# Patient Record
Sex: FEMALE | Race: WHITE | NOT HISPANIC OR LATINO | Employment: FULL TIME | URBAN - METROPOLITAN AREA
[De-identification: names, ages, dates, MRNs, and addresses within clinical notes are randomized per-mention and may not be internally consistent; named-entity substitution may affect disease eponyms.]

---

## 2023-09-28 ENCOUNTER — TELEPHONE (OUTPATIENT)
Dept: GASTROENTEROLOGY | Facility: CLINIC | Age: 63
End: 2023-09-28

## 2023-09-28 ENCOUNTER — OFFICE VISIT (OUTPATIENT)
Dept: GASTROENTEROLOGY | Facility: CLINIC | Age: 63
End: 2023-09-28
Payer: COMMERCIAL

## 2023-09-28 VITALS
DIASTOLIC BLOOD PRESSURE: 84 MMHG | SYSTOLIC BLOOD PRESSURE: 122 MMHG | WEIGHT: 168.2 LBS | HEIGHT: 60 IN | BODY MASS INDEX: 33.02 KG/M2

## 2023-09-28 DIAGNOSIS — Z12.11 COLON CANCER SCREENING: ICD-10-CM

## 2023-09-28 DIAGNOSIS — K21.9 GASTROESOPHAGEAL REFLUX DISEASE WITHOUT ESOPHAGITIS: Primary | ICD-10-CM

## 2023-09-28 PROCEDURE — 99204 OFFICE O/P NEW MOD 45 MIN: CPT | Performed by: INTERNAL MEDICINE

## 2023-09-28 RX ORDER — CLINDAMYCIN HYDROCHLORIDE 300 MG/1
CAPSULE ORAL
COMMUNITY
Start: 2023-09-21

## 2023-09-28 RX ORDER — LISINOPRIL 20 MG/1
20 TABLET ORAL DAILY
COMMUNITY
Start: 2023-09-24

## 2023-09-28 RX ORDER — PANTOPRAZOLE SODIUM 40 MG/1
40 TABLET, DELAYED RELEASE ORAL DAILY
COMMUNITY
Start: 2023-09-18 | End: 2023-09-28

## 2023-09-28 RX ORDER — LAMOTRIGINE 100 MG/1
TABLET ORAL
COMMUNITY
Start: 2023-09-22

## 2023-09-28 RX ORDER — ESOMEPRAZOLE MAGNESIUM 40 MG/1
40 CAPSULE, DELAYED RELEASE ORAL
Qty: 60 CAPSULE | Refills: 5 | Status: SHIPPED | OUTPATIENT
Start: 2023-09-28

## 2023-09-28 NOTE — TELEPHONE ENCOUNTER
Scheduled date of EGD(as of today): 11/29/23  Physician performing EGD: Dr Nicolas Pena  Location of EGD: [facility]  Instructions reviewed with patient by: Zach Reese  Clearances: No

## 2023-09-28 NOTE — PROGRESS NOTES
[address] [place] [address] [facility] - Outpatient Consultation  Akshat Rivera 63 y.o. female MRN: [id number]  Encounter: [id number]          ASSESSMENT AND PLAN:      1. Gastroesophageal reflux disease without esophagitis  Known GERD. Now with typical and atypical symptoms. Unclear how much is GERD versus non-GERD related  - EGD; Future  -Switch to esomeprazole (NexIUM) 40 MG capsule; Take 1 capsule (40 mg total) by mouth 2 (two) times a day before meals  Dispense: 60 capsule; Refill: 5    2. Colon cancer screening  Last colonoscopy October 2015. Due for follow-up 2025    ______________________________________________________________________    HPI: The patient is seen in the office today for evaluation of GERD. She was worked up by me in the distant past including an endoscopy in April 2010. She had been on omeprazole in the past which she reports gave her some tingling in her lips. She was on Pepcid. Over the last year she is having increasing issues with both typical and atypical GERD symptoms. She reports some heartburn and indigestion but also reports some postnasal drip with a chronic cough and some regurgitation. She reports some intermittent dysphagia. She denies any odynophagia or early satiety. She denies any chest or abdominal pain she was started on pantoprazole 40 mg daily about 5 months ago which has helped a little but did not completely alleviate her symptoms. She reports that she will often wake up in the morning with a burning tongue and a sore throat. She reports wine makes her symptoms worse. She does not have issues with caffeine. She does not use NSAIDs. Her weight is stable. Her bowels are normal.  She denies any GI bleeding. REVIEW OF SYSTEMS:    CONSTITUTIONAL: Denies any fever, chills, rigors, and weight loss. HEENT: No earache or tinnitus. Denies hearing loss or visual disturbances. CARDIOVASCULAR: No chest pain or palpitations.    RESPIRATORY: Denies any cough, hemoptysis, shortness of breath or dyspnea on exertion. GASTROINTESTINAL: As noted in the History of Present Illness. GENITOURINARY: No problems with urination. Denies any hematuria or dysuria. NEUROLOGIC: No dizziness or vertigo, denies headaches. MUSCULOSKELETAL: Denies any muscle or joint pain. SKIN: Denies skin rashes or itching. ENDOCRINE: Denies excessive thirst. Denies intolerance to heat or cold. PSYCHOSOCIAL: Denies depression or anxiety. Denies any recent memory loss. Historical Information   Past Medical History:   Diagnosis Date   • Colon polyp ? ?   • GERD (gastroesophageal reflux disease)    • Hypertension      Past Surgical History:   Procedure Laterality Date   • COLONOSCOPY  within 10 years    2015   • UPPER GASTROINTESTINAL ENDOSCOPY  a few times years ago    2010     Social History   Social History     Substance and Sexual Activity   Alcohol Use Yes   • Alcohol/week: 3.0 standard drinks of alcohol   • Types: 3 Glasses of wine per week     Social History     Substance and Sexual Activity   Drug Use Not Currently   • Types: Marijuana     Social History     Tobacco Use   Smoking Status Former   • Packs/day: 0.25   • Years: 5.00   • Total pack years: 1.25   • Types: Cigarettes   Smokeless Tobacco Never   Tobacco Comments    occasional around 23years old     Family History   Problem Relation Age of Onset   • Cancer Mother         lung-non smoker   • Heart disease Father         heart attack at 46   • Colon cancer Neg Hx    • Colon polyps Neg Hx        Meds/Allergies       Current Outpatient Medications:   •  clindamycin (CLEOCIN) 300 MG capsule  •  esomeprazole (NexIUM) 40 MG capsule  •  lamoTRIgine (LaMICtal) 100 mg tablet  •  lisinopril (ZESTRIL) 20 mg tablet    Allergies   Allergen Reactions   • Penicillins Anaphylaxis   • Nsaids Facial Swelling   • Cefuroxime Rash           Objective     Blood pressure 122/84, height 5' (1.524 m), weight 76.3 kg (168 lb 3.2 oz).  Body mass index is 32.85 kg/m². PHYSICAL EXAM:      General Appearance:   Alert, cooperative, no distress   HEENT:   Normocephalic, atraumatic, anicteric.     Neck:  Supple, symmetrical, trachea midline   Lungs:   Clear to auscultation bilaterally; no rales, rhonchi or wheezing; respirations unlabored    Heart[de-identified]   Regular rate and rhythm; no murmur, rub, or gallop. Abdomen:   Soft, non-tender, non-distended; normal bowel sounds; no masses, no organomegaly    Genitalia:   Deferred    Rectal:   Deferred    Extremities:  No cyanosis, clubbing or edema    Pulses:  2+ and symmetric    Skin:  No jaundice, rashes, or lesions    Lymph nodes:  No palpable cervical lymphadenopathy        Lab Results:   No visits with results within 1 Day(s) from this visit. Latest known visit with results is:   No results found for any previous visit. Radiology Results:   No results found.

## 2023-10-02 ENCOUNTER — TELEPHONE (OUTPATIENT)
Age: 63
End: 2023-10-02

## 2023-11-10 ENCOUNTER — APPOINTMENT (OUTPATIENT)
Dept: RADIOLOGY | Facility: CLINIC | Age: 63
End: 2023-11-10
Payer: COMMERCIAL

## 2023-11-10 ENCOUNTER — OFFICE VISIT (OUTPATIENT)
Dept: OBGYN CLINIC | Facility: CLINIC | Age: 63
End: 2023-11-10
Payer: COMMERCIAL

## 2023-11-10 VITALS
DIASTOLIC BLOOD PRESSURE: 84 MMHG | WEIGHT: 170 LBS | BODY MASS INDEX: 33.38 KG/M2 | SYSTOLIC BLOOD PRESSURE: 124 MMHG | HEIGHT: 60 IN | HEART RATE: 63 BPM

## 2023-11-10 DIAGNOSIS — G89.29 CHRONIC PAIN OF LEFT KNEE: ICD-10-CM

## 2023-11-10 DIAGNOSIS — M25.562 CHRONIC PAIN OF LEFT KNEE: ICD-10-CM

## 2023-11-10 DIAGNOSIS — Z96.652 HISTORY OF LEFT KNEE REPLACEMENT: ICD-10-CM

## 2023-11-10 DIAGNOSIS — Z96.652 HISTORY OF LEFT KNEE REPLACEMENT: Primary | ICD-10-CM

## 2023-11-10 PROCEDURE — 73562 X-RAY EXAM OF KNEE 3: CPT

## 2023-11-10 PROCEDURE — 77073 BONE LENGTH STUDIES: CPT

## 2023-11-10 PROCEDURE — 99203 OFFICE O/P NEW LOW 30 MIN: CPT | Performed by: STUDENT IN AN ORGANIZED HEALTH CARE EDUCATION/TRAINING PROGRAM

## 2023-11-10 NOTE — PROGRESS NOTES
Knee New Office Note    Assessment:     1. History of left knee replacement    2. Chronic pain of left knee        Plan:     Problem List Items Addressed This Visit    None  Visit Diagnoses       History of left knee replacement    -  Primary    Relevant Orders    XR knee 3 vw left non injury    XR bone length (scanogram)    C-reactive protein    Sedimentation rate, automated    Chronic pain of left knee               67 y/o female with left knee pain following TKA with Dr. Venus Zarate at [facility] in 2022. Xrays of the left knee reviewed today showing that the prosthesis appears to be in good position with no obvious signs of loosening. She has good range of motion on exam, with some mild mid-flexion laxity. She does not have symptoms that are classic of start up pain to suggest loosening. We will start by working her up for infection with CRP and sed rate. If these are elevated, we will consider aspiration of the left knee for eval for infection. Also may consider bone scan in the future. Follow up in a few weeks to discuss lab results. Subjective:     Patient ID: Gurjit Huog is a 66 y.o. female. Patient seen in consultation at the request of Dr. Elizabeth Sandhoff, MD    Chief Complaint:  HPI:  Patient is a 67 y/o female who presents today for a second opinion of her Left knee. She has a history of a Left press-fit triathalon TKA with Dr. Venus Zarate at [facility] in May of 2022. She states that since that time she has never felt that the knee has been right. She notices stiffness in the left knee, worse with walking and after she has been walking for long periods of time. She states that until recently she was struggling with being able to pick her foot up to ambulate. She also complains of swelling over the anterior aspect of the knee that has been present for 9 months. She denies classic start up symptoms. She denies fever or chills.  She denies any workup for infection in the past.     Allergy:  Allergies   Allergen Reactions    Penicillins Anaphylaxis    Nsaids Facial Swelling    Cefuroxime Rash     Medications:  all current active meds have been reviewed  Past Medical History:  Past Medical History:   Diagnosis Date    Colon polyp ? ? GERD (gastroesophageal reflux disease)     Hypertension      Past Surgical History:  Past Surgical History:   Procedure Laterality Date    COLONOSCOPY  within 10 years    2015    UPPER GASTROINTESTINAL ENDOSCOPY  a few times years ago    2010     Family History:  Family History   Problem Relation Age of Onset    Cancer Mother         lung-non smoker    Heart disease Father         heart attack at 46    Colon cancer Neg Hx     Colon polyps Neg Hx      Social History:  Social History     Substance and Sexual Activity   Alcohol Use Yes    Alcohol/week: 3.0 standard drinks of alcohol    Types: 3 Glasses of wine per week     Social History     Substance and Sexual Activity   Drug Use Not Currently    Types: Marijuana     Social History     Tobacco Use   Smoking Status Former    Packs/day: 0.25    Years: 5.00    Total pack years: 1.25    Types: Cigarettes   Smokeless Tobacco Never   Tobacco Comments    occasional around 23years old           ROS:  General: Per HPI  Skin: Negative, except if noted below  HEENT: Negative  Respiratory: Negative  Cardiovascular: Negative  Gastrointestinal: Negative  Urinary: Negative  Vascular: Negative  Musculoskeletal: Positive per HPI   Neurologic: Positive per HPI  Endocrine: Negative    Objective:  BP Readings from Last 1 Encounters:   11/10/23 124/84      Wt Readings from Last 1 Encounters:   11/10/23 77.1 kg (170 lb)        Respiratory:   non-labored respirations    Lymphatics:  no palpable lymph nodes    Gait:   antalgic    Neurologic:   Alert and oriented times 3  Patient with normal sensation except as noted below  Deep tendon reflexes 2+ except as noted in MSK exam    Bilateral Lower Extremity:  Left Knee:       Inspection:  skin intact, previous surgical incision noted, small aseptic pre-patellar fluid collection     Overall limb alignment neutral    Effusion: none    ROM 0-120 wo pain    Extensor Lag: none    Palpation: no Joint line tenderness to palpation    AP Stability at 90 deg stable    M/L stability in full extension stable    M/L stability in midflexion +mild instability    Motor: 5/5 Q/HS/TA/GS/P    Pulses: 2+ DP / 2+ PT    SILT DP/SP/S/S/TN      Imaging:  My interpretation XR AP scanogram/AP bilateral knee/lateral/nguyen/sunrise left knee: s/p left press-fit CR triathalon TKA, components in good position. No fracture or dislocation. Severe right knee tricompartmental osteoarthritis. BMI:   Estimated body mass index is 33.2 kg/m² as calculated from the following:    Height as of this encounter: 5' (1.524 m). Weight as of this encounter: 77.1 kg (170 lb). BSA:   Estimated body surface area is 1.74 meters squared as calculated from the following:    Height as of this encounter: 5' (1.524 m). Weight as of this encounter: 77.1 kg (170 lb).            Scribe Attestation      I,:  Damaris Lambert PA-C am acting as a scribe while in the presence of the attending physician.:       I,:  Gretchen Steele, DO personally performed the services described in this documentation    as scribed in my presence.:

## 2023-11-15 ENCOUNTER — ANESTHESIA EVENT (OUTPATIENT)
Dept: ANESTHESIOLOGY | Facility: AMBULATORY SURGERY CENTER | Age: 63
End: 2023-11-15

## 2023-11-15 ENCOUNTER — ANESTHESIA (OUTPATIENT)
Dept: ANESTHESIOLOGY | Facility: AMBULATORY SURGERY CENTER | Age: 63
End: 2023-11-15

## 2023-11-27 ENCOUNTER — TELEPHONE (OUTPATIENT)
Age: 63
End: 2023-11-27

## 2023-11-27 PROBLEM — Z12.11 COLON CANCER SCREENING: Status: RESOLVED | Noted: 2023-09-28 | Resolved: 2023-11-27

## 2023-11-27 NOTE — TELEPHONE ENCOUNTER
Caller: Patient     Doctor: Lizbeth Antony    Reason for call: Patient is having stiffness and trouble walking. She feels like there is a "softball" on the top of her left kneecap. She had labs done this morning at [facility] in [facility].     Call back#: [phone]

## 2023-11-28 LAB
CRP SERPL-MCNC: 7 MG/L (ref 0–10)
ERYTHROCYTE [SEDIMENTATION RATE] IN BLOOD BY WESTERGREN METHOD: 21 MM/HR (ref 0–40)

## 2023-11-28 NOTE — TELEPHONE ENCOUNTER
Spoke with the patient, she is having worsening pain in the knee and swelling in the area of the bursa.   Advised that crp and sed rate are normal.  We will see her for a sooner recheck on 12/7 at 5pm

## 2023-12-07 ENCOUNTER — OFFICE VISIT (OUTPATIENT)
Dept: OBGYN CLINIC | Facility: MEDICAL CENTER | Age: 63
End: 2023-12-07
Payer: COMMERCIAL

## 2023-12-07 VITALS
WEIGHT: 170 LBS | BODY MASS INDEX: 33.38 KG/M2 | SYSTOLIC BLOOD PRESSURE: 115 MMHG | HEART RATE: 74 BPM | DIASTOLIC BLOOD PRESSURE: 79 MMHG | HEIGHT: 60 IN

## 2023-12-07 DIAGNOSIS — Z96.652 HISTORY OF LEFT KNEE REPLACEMENT: ICD-10-CM

## 2023-12-07 DIAGNOSIS — G89.29 CHRONIC PAIN OF LEFT KNEE: ICD-10-CM

## 2023-12-07 DIAGNOSIS — M70.52 PES ANSERINUS BURSITIS OF LEFT KNEE: Primary | ICD-10-CM

## 2023-12-07 DIAGNOSIS — M25.562 CHRONIC PAIN OF LEFT KNEE: ICD-10-CM

## 2023-12-07 DIAGNOSIS — M70.42 PREPATELLAR BURSITIS OF LEFT KNEE: ICD-10-CM

## 2023-12-07 PROCEDURE — 99213 OFFICE O/P EST LOW 20 MIN: CPT | Performed by: STUDENT IN AN ORGANIZED HEALTH CARE EDUCATION/TRAINING PROGRAM

## 2023-12-07 PROCEDURE — 20610 DRAIN/INJ JOINT/BURSA W/O US: CPT | Performed by: STUDENT IN AN ORGANIZED HEALTH CARE EDUCATION/TRAINING PROGRAM

## 2023-12-07 RX ORDER — LIDOCAINE HYDROCHLORIDE 10 MG/ML
2 INJECTION, SOLUTION INFILTRATION; PERINEURAL
Status: COMPLETED | OUTPATIENT
Start: 2023-12-07 | End: 2023-12-07

## 2023-12-07 RX ORDER — TRIAMCINOLONE ACETONIDE 40 MG/ML
40 INJECTION, SUSPENSION INTRA-ARTICULAR; INTRAMUSCULAR
Status: COMPLETED | OUTPATIENT
Start: 2023-12-07 | End: 2023-12-07

## 2023-12-07 RX ADMIN — LIDOCAINE HYDROCHLORIDE 2 ML: 10 INJECTION, SOLUTION INFILTRATION; PERINEURAL at 17:00

## 2023-12-07 RX ADMIN — TRIAMCINOLONE ACETONIDE 40 MG: 40 INJECTION, SUSPENSION INTRA-ARTICULAR; INTRAMUSCULAR at 17:00

## 2023-12-07 NOTE — PROGRESS NOTES
Knee Follow up Office Note    Assessment:     1. History of left knee replacement    2. Chronic pain of left knee    3. Prepatellar bursitis of left knee          Plan:     Problem List Items Addressed This Visit    None  Visit Diagnoses       History of left knee replacement    -  Primary    Chronic pain of left knee        Prepatellar bursitis of left knee                 65 y/o female with left knee pain following TKA with Dr. Manuela Bauer at [facility] in 2022. CRP and ESR are normal with no signs of infection. She has good range of motion on exam, with some mild mid-flexion laxity. She does not have symptoms that are classic of start up pain to suggest loosening. On physical exam today she continues with pain over the medial aspect of the knee, more specifically over the pes bursa. She does have continued swelling of the prepatellar bursa, but there does not seem to be enough fluid to aspirate. Cortisone injection performed into the pes bursa today. Continue stretching and work on bike and quad strengthening exercises. Follow up as needed. Subjective:     Patient ID: Barby Iglesias is a 66 y.o. female. Chief Complaint:  HPI:  Patient is a 65 y/o female who presents today for a follow up evaluation of her Left knee. She has a history of a Left press-fit triathalon TKA with Dr. Manuela Bauer at [facility] in May of 2022. She states that since that time she has never felt that the knee has been right. She notices stiffness in the left knee, worse with walking and after she has been walking for long periods of time. She also complains of swelling over the anterior aspect of the knee that has been present for 9 months. She denies classic start up symptoms. She denies fever or chills. At last visit ESR and CRP were ordered to eval for infection.   We also discussed that she has small prepatellar bursitis on exam.  Today she states that she continues with pain in the knee and swelling over the prepatellar bursa. Allergy:  Allergies   Allergen Reactions    Penicillins Anaphylaxis    Nsaids Facial Swelling    Cefuroxime Rash     Medications:  all current active meds have been reviewed  Past Medical History:  Past Medical History:   Diagnosis Date    Colon polyp ? ?     GERD (gastroesophageal reflux disease)     Hypertension      Past Surgical History:  Past Surgical History:   Procedure Laterality Date    COLONOSCOPY  within 10 years    2015    UPPER GASTROINTESTINAL ENDOSCOPY  a few times years ago    2010     Family History:  Family History   Problem Relation Age of Onset    Cancer Mother         lung-non smoker    Heart disease Father         heart attack at 46    Colon cancer Neg Hx     Colon polyps Neg Hx      Social History:  Social History     Substance and Sexual Activity   Alcohol Use Yes    Alcohol/week: 3.0 standard drinks of alcohol    Types: 3 Glasses of wine per week     Social History     Substance and Sexual Activity   Drug Use Not Currently    Types: Marijuana     Social History     Tobacco Use   Smoking Status Former    Packs/day: 0.25    Years: 5.00    Total pack years: 1.25    Types: Cigarettes   Smokeless Tobacco Never   Tobacco Comments    occasional around 23years old           ROS:  General: Per HPI  Skin: Negative, except if noted below  HEENT: Negative  Respiratory: Negative  Cardiovascular: Negative  Gastrointestinal: Negative  Urinary: Negative  Vascular: Negative  Musculoskeletal: Positive per HPI   Neurologic: Positive per HPI  Endocrine: Negative    Objective:  BP Readings from Last 1 Encounters:   12/07/23 115/79      Wt Readings from Last 1 Encounters:   12/07/23 77.1 kg (170 lb)        Respiratory:   non-labored respirations    Lymphatics:  no palpable lymph nodes    Gait:   antalgic    Neurologic:   Alert and oriented times 3  Patient with normal sensation except as noted below  Deep tendon reflexes 2+ except as noted in MSK exam    Bilateral Lower Extremity:  Left Knee: Inspection:  skin intact, previous surgical incision noted, small aseptic pre-patellar fluid collection     Overall limb alignment neutral    Effusion: none    ROM 0-120 wo pain    Extensor Lag: none    Palpation: no Joint line tenderness to palpation +TTP over pes bursa    AP Stability at 90 deg stable    M/L stability in full extension stable    M/L stability in midflexion +mild instability    Motor: 5/5 Q/HS/TA/GS/P    Pulses: 2+ DP / 2+ PT    SILT DP/SP/S/S/TN      Imaging:  No new imaging today      Large joint arthrocentesis: L pes anserine bursa  Universal Protocol:  Consent: Verbal consent obtained. Risks and benefits: risks, benefits and alternatives were discussed  Consent given by: patient  Patient understanding: patient states understanding of the procedure being performed  Supporting Documentation  Indications: pain   Procedure Details  Location: knee - L pes anserine bursa  Preparation: Patient was prepped and draped in the usual sterile fashion  Needle size: 22 G  Approach: anterolateral  Medications administered: 2 mL lidocaine 1 %; 40 mg triamcinolone acetonide 40 mg/mL    Patient tolerance: patient tolerated the procedure well with no immediate complications  Dressing:  Sterile dressing applied          BMI:   Estimated body mass index is 33.2 kg/m² as calculated from the following:    Height as of this encounter: 5' (1.524 m). Weight as of this encounter: 77.1 kg (170 lb). BSA:   Estimated body surface area is 1.74 meters squared as calculated from the following:    Height as of this encounter: 5' (1.524 m). Weight as of this encounter: 77.1 kg (170 lb).            Scribe Attestation      I,:  Solomon Garcia PA-C am acting as a scribe while in the presence of the attending physician.:       I,:  Abraham Juarez DO personally performed the services described in this documentation    as scribed in my presence.:

## 2023-12-27 LAB
CREAT ?TM UR-SCNC: 203.5 UMOL/L
EXT ALBUMIN URINE RANDOM: 20.9
MICROALBUMIN/CREAT UR: 10 MG/G{CREAT}

## 2024-01-10 ENCOUNTER — TELEPHONE (OUTPATIENT)
Dept: GASTROENTEROLOGY | Facility: CLINIC | Age: 64
End: 2024-01-10

## 2024-01-18 ENCOUNTER — ANESTHESIA (OUTPATIENT)
Dept: ANESTHESIOLOGY | Facility: AMBULATORY SURGERY CENTER | Age: 64
End: 2024-01-18

## 2024-01-18 ENCOUNTER — ANESTHESIA EVENT (OUTPATIENT)
Dept: ANESTHESIOLOGY | Facility: AMBULATORY SURGERY CENTER | Age: 64
End: 2024-01-18

## 2024-01-31 ENCOUNTER — TELEPHONE (OUTPATIENT)
Age: 64
End: 2024-01-31

## 2024-02-01 ENCOUNTER — ANESTHESIA (OUTPATIENT)
Dept: GASTROENTEROLOGY | Facility: AMBULATORY SURGERY CENTER | Age: 64
End: 2024-02-01

## 2024-02-01 ENCOUNTER — HOSPITAL ENCOUNTER (OUTPATIENT)
Dept: GASTROENTEROLOGY | Facility: AMBULATORY SURGERY CENTER | Age: 64
Discharge: HOME | End: 2024-02-01
Payer: COMMERCIAL

## 2024-02-01 ENCOUNTER — ANESTHESIA EVENT (OUTPATIENT)
Dept: GASTROENTEROLOGY | Facility: AMBULATORY SURGERY CENTER | Age: 64
End: 2024-02-01

## 2024-02-01 VITALS
DIASTOLIC BLOOD PRESSURE: 57 MMHG | WEIGHT: 170 LBS | BODY MASS INDEX: 33.38 KG/M2 | TEMPERATURE: 98.1 F | HEIGHT: 60 IN | HEART RATE: 66 BPM | SYSTOLIC BLOOD PRESSURE: 99 MMHG | OXYGEN SATURATION: 96 % | RESPIRATION RATE: 21 BRPM

## 2024-02-01 DIAGNOSIS — K21.9 GASTROESOPHAGEAL REFLUX DISEASE WITHOUT ESOPHAGITIS: ICD-10-CM

## 2024-02-01 PROBLEM — I10 HTN (HYPERTENSION): Status: ACTIVE | Noted: 2024-02-01

## 2024-02-01 PROCEDURE — 88305 TISSUE EXAM BY PATHOLOGIST: CPT | Performed by: STUDENT IN AN ORGANIZED HEALTH CARE EDUCATION/TRAINING PROGRAM

## 2024-02-01 PROCEDURE — 43239 EGD BIOPSY SINGLE/MULTIPLE: CPT | Performed by: INTERNAL MEDICINE

## 2024-02-01 PROCEDURE — 88342 IMHCHEM/IMCYTCHM 1ST ANTB: CPT | Performed by: STUDENT IN AN ORGANIZED HEALTH CARE EDUCATION/TRAINING PROGRAM

## 2024-02-01 RX ORDER — SODIUM CHLORIDE, SODIUM LACTATE, POTASSIUM CHLORIDE, CALCIUM CHLORIDE 600; 310; 30; 20 MG/100ML; MG/100ML; MG/100ML; MG/100ML
INJECTION, SOLUTION INTRAVENOUS CONTINUOUS PRN
Status: DISCONTINUED | OUTPATIENT
Start: 2024-02-01 | End: 2024-02-01

## 2024-02-01 RX ORDER — SODIUM CHLORIDE, SODIUM LACTATE, POTASSIUM CHLORIDE, CALCIUM CHLORIDE 600; 310; 30; 20 MG/100ML; MG/100ML; MG/100ML; MG/100ML
50 INJECTION, SOLUTION INTRAVENOUS CONTINUOUS
Status: DISCONTINUED | OUTPATIENT
Start: 2024-02-01 | End: 2024-02-05 | Stop reason: HOSPADM

## 2024-02-01 RX ORDER — LIDOCAINE HYDROCHLORIDE 20 MG/ML
INJECTION, SOLUTION EPIDURAL; INFILTRATION; INTRACAUDAL; PERINEURAL AS NEEDED
Status: DISCONTINUED | OUTPATIENT
Start: 2024-02-01 | End: 2024-02-01

## 2024-02-01 RX ORDER — PROPOFOL 10 MG/ML
INJECTION, EMULSION INTRAVENOUS AS NEEDED
Status: DISCONTINUED | OUTPATIENT
Start: 2024-02-01 | End: 2024-02-01

## 2024-02-01 RX ADMIN — PROPOFOL 200 MG: 10 INJECTION, EMULSION INTRAVENOUS at 10:26

## 2024-02-01 RX ADMIN — SODIUM CHLORIDE, SODIUM LACTATE, POTASSIUM CHLORIDE, CALCIUM CHLORIDE: 600; 310; 30; 20 INJECTION, SOLUTION INTRAVENOUS at 10:14

## 2024-02-01 RX ADMIN — LIDOCAINE HYDROCHLORIDE 50 MG: 20 INJECTION, SOLUTION EPIDURAL; INFILTRATION; INTRACAUDAL; PERINEURAL at 10:26

## 2024-02-01 RX ADMIN — PROPOFOL 100 MG: 10 INJECTION, EMULSION INTRAVENOUS at 10:29

## 2024-02-01 RX ADMIN — SODIUM CHLORIDE, SODIUM LACTATE, POTASSIUM CHLORIDE, CALCIUM CHLORIDE 50 ML/HR: 600; 310; 30; 20 INJECTION, SOLUTION INTRAVENOUS at 10:17

## 2024-02-01 NOTE — H&P
History and Physical -  [facility]  Saritha Boss 63 y.o. female MRN: [id number]    HPI: Saritha Boss is a 63 y.o. year old female who presents for EGD secondary to GERD    REVIEW OF SYSTEMS: Per the HPI, and otherwise unremarkable.    Historical Information   Past Medical History:   Diagnosis Date    Arthritis     Colon polyp ??    GERD (gastroesophageal reflux disease)     Hypertension     Lyme disease      Past Surgical History:   Procedure Laterality Date     SECTION      COLONOSCOPY  within 10 years        JOINT REPLACEMENT      UPPER GASTROINTESTINAL ENDOSCOPY  a few times years ago    2010    VULVA SURGERY       Social History   Social History     Substance and Sexual Activity   Alcohol Use Yes    Alcohol/week: 3.0 standard drinks of alcohol    Types: 3 Glasses of wine per week    Comment: social     Social History     Substance and Sexual Activity   Drug Use Not Currently    Types: Marijuana     Social History     Tobacco Use   Smoking Status Former    Current packs/day: 0.25    Average packs/day: 0.3 packs/day for 5.0 years (1.3 ttl pk-yrs)    Types: Cigarettes   Smokeless Tobacco Never   Tobacco Comments    occasional around 22 years old     Family History   Problem Relation Age of Onset    Cancer Mother         lung-non smoker    Heart disease Father         heart attack at 51    Colon cancer Neg Hx     Colon polyps Neg Hx        Meds/Allergies       Current Outpatient Medications:     esomeprazole (NexIUM) 40 MG capsule    lisinopril (ZESTRIL) 20 mg tablet    clindamycin (CLEOCIN) 300 MG capsule    lamoTRIgine (LaMICtal) 100 mg tablet    Current Facility-Administered Medications:     lactated ringers infusion, 50 mL/hr, Intravenous, Continuous, 50 mL/hr at 24 1017    Facility-Administered Medications Ordered in Other Encounters:     lactated ringers infusion, , Intravenous, Continuous PRN, New Bag at 24 1014    lidocaine (PF) (XYLOCAINE-MPF) 2 % injection, ,  Intravenous, PRN, 50 mg at 02/01/24 1026    propofol (DIPRIVAN) 200 MG/20ML bolus injection, , Intravenous, PRN, 200 mg at 02/01/24 1026    Allergies   Allergen Reactions    Penicillins Anaphylaxis    Naproxen Facial Swelling    Nsaids Facial Swelling    Cefuroxime Rash       Objective     /63   Pulse 77   Temp 98.1 °F (36.7 °C) (Temporal)   Resp 17   Ht 5' (1.524 m)   Wt 77.1 kg (170 lb)   SpO2 95%   BMI 33.20 kg/m²     PHYSICAL EXAM    Gen: NAD AAOx3  Head: Normocephalic, Atraumatic  CV: S1S2 RRR no m/r/g  CHEST: Clear b/l no c/r/w  ABD: soft, +BS NT/ND  EXT: no edema    ASSESSMENT/PLAN:  This is a 63 y.o. year old female here for EGD secondary to GERD, and she is stable and optimized for her procedure.

## 2024-02-07 PROCEDURE — 88342 IMHCHEM/IMCYTCHM 1ST ANTB: CPT | Performed by: STUDENT IN AN ORGANIZED HEALTH CARE EDUCATION/TRAINING PROGRAM

## 2024-02-07 PROCEDURE — 88305 TISSUE EXAM BY PATHOLOGIST: CPT | Performed by: STUDENT IN AN ORGANIZED HEALTH CARE EDUCATION/TRAINING PROGRAM

## 2024-02-19 NOTE — ANESTHESIA POSTPROCEDURE EVALUATION
Post-Op Assessment Note    CV Status:  Stable  Pain Score: 0    Pain management: adequate       Mental Status:  Arousable and sleepy   Hydration Status:  Stable   PONV Controlled:  Controlled   Airway Patency:  Patent     Post Op Vitals Reviewed: Yes    No anethesia notable event occurred.    Staff: CRNA               BP   120/54   Temp 97.6   Pulse 75   Resp 14   SpO2 99     
lump on arm

## 2024-04-06 DIAGNOSIS — K21.9 GASTROESOPHAGEAL REFLUX DISEASE WITHOUT ESOPHAGITIS: ICD-10-CM

## 2024-04-08 RX ORDER — ESOMEPRAZOLE MAGNESIUM 40 MG/1
CAPSULE, DELAYED RELEASE ORAL
Qty: 60 CAPSULE | Refills: 5 | Status: SHIPPED | OUTPATIENT
Start: 2024-04-08

## 2024-09-01 ENCOUNTER — TELEPHONE (OUTPATIENT)
Age: 64
End: 2024-09-01

## 2024-09-04 NOTE — TELEPHONE ENCOUNTER
PA for esomperaozle 40 mg bid SUBMITTED through [employer]     via    [x]CMM-KEY: Over the last year she is having increasing issues with both typical and atypical GERD symptoms. She reports some heartburn and indigestion but also reports some postnasal drip with a chronic cough and some regurgitation. She reports some intermittent dysphagia   [][employer]s-Case ID #   []Faxed to plan   []Other website   []Phone call Case ID #     Office notes sent, clinical questions answered. Awaiting determination    Turnaround time for your insurance to make a decision on your Prior Authorization can take 7-21 business days.

## 2024-09-05 NOTE — TELEPHONE ENCOUNTER
Rcvd fax from [place]  Member not found in database  Scanned in media    Called pharmacy  BIN [id number]  PCN [id number]  GRP [id number]  ID [id number]  PHONE NUMBER OF INSURANCE   NAME OF INSURANCE [employer]

## 2024-09-05 NOTE — TELEPHONE ENCOUNTER
PA for Esomeprazole 40 mg bid SUBMITTED     via    [x][employer]-KEY: [id number]  [][employer]-Case ID #   []Faxed to plan   []Other website   []Phone call Case ID #     Office notes sent, clinical questions answered. Awaiting determination    Turnaround time for your insurance to make a decision on your Prior Authorization can take 7-21 business days.

## 2025-01-05 DIAGNOSIS — Z00.6 ENCOUNTER FOR EXAMINATION FOR NORMAL COMPARISON OR CONTROL IN CLINICAL RESEARCH PROGRAM: ICD-10-CM

## 2025-02-18 ENCOUNTER — PREP FOR PROCEDURE (OUTPATIENT)
Age: 65
End: 2025-02-18

## 2025-02-18 ENCOUNTER — TELEPHONE (OUTPATIENT)
Age: 65
End: 2025-02-18

## 2025-02-18 DIAGNOSIS — Z12.11 SPECIAL SCREENING FOR MALIGNANT NEOPLASMS, COLON: Primary | ICD-10-CM

## 2025-02-18 NOTE — TELEPHONE ENCOUNTER
Patients GI provider:  DR BRAN    Number to return call: [phone]    Reason for call: Pt requesting last date of Colonoscopy. Call center unable to access old records. Patient wants to make sure colonoscopy is scheduled in the recommended time frame due to insurance.    Please contact patient with information and change appt accordingly.

## 2025-02-18 NOTE — TELEPHONE ENCOUNTER
02/18/25  Screened by: Cara Rascon MA    Referring Provider PCP    Pre- Screening:     There is no height or weight on file to calculate BMI.  Has patient been referred for a routine screening Colonoscopy? yes  Is the patient between 45-75 years old? yes      Previous Colonoscopy yes   If yes:    Date: approx 10 years    Facility: [facility]    Reason: screening        Does the patient want to see a Gastroenterologist prior to their procedure OR are they having any GI symptoms? no    Has the patient been hospitalized or had abdominal surgery in the past 6 months? no    Does the patient use supplemental oxygen? no    Does the patient take Coumadin, Lovenox, Plavix, Elliquis, Xarelto, or other blood thinning medication? no    Has the patient had a stroke, cardiac event, or stent placed in the past year? no        If patient is between 45yrs - 49yrs, please advise patient that we will have to confirm benefits & coverage with their insurance company for a routine screening colonoscopy.

## 2025-02-18 NOTE — TELEPHONE ENCOUNTER
Spoke with pt and confirmed per Sherly BADILLO who checked if it was ok for pt to schedule before recall date that pt can keep the scheduled date. Pt has a hx of polyps

## 2025-02-18 NOTE — TELEPHONE ENCOUNTER
Scheduled date of colonoscopy (as of today): 3/20/2025  Physician performing colonoscopy: DR BRAN  Location of colonoscopy: [facility]  Bowel prep reviewed with patient: RIKI/ANU  Instructions reviewed with patient by: Cara via telephone. Procedure directions sent via [employer].  Clearances:

## 2025-02-20 ENCOUNTER — TELEPHONE (OUTPATIENT)
Dept: GASTROENTEROLOGY | Facility: CLINIC | Age: 65
End: 2025-02-20

## 2025-02-20 NOTE — TELEPHONE ENCOUNTER
----- Message from Sherly BADILLO sent at 2/20/2025  9:15 AM EST -----  Regarding: [facility] Insurance Contract Issues  [facility] Insurance Contract issues. Patient carries [employer]. She was added to the [facility] scheduled 3/20/2025. Please reschedule to the hospital. Thank you.

## 2025-03-25 ENCOUNTER — HOSPITAL ENCOUNTER (OUTPATIENT)
Dept: MAMMOGRAPHY | Facility: CLINIC | Age: 65
Discharge: HOME | End: 2025-03-25
Payer: COMMERCIAL

## 2025-03-25 ENCOUNTER — HOSPITAL ENCOUNTER (OUTPATIENT)
Dept: BONE DENSITY | Facility: CLINIC | Age: 65
Discharge: HOME | End: 2025-03-25
Payer: COMMERCIAL

## 2025-03-25 VITALS — WEIGHT: 170 LBS | HEIGHT: 60 IN | BODY MASS INDEX: 33.38 KG/M2

## 2025-03-25 VITALS — HEIGHT: 60 IN | WEIGHT: 170 LBS | BODY MASS INDEX: 33.38 KG/M2

## 2025-03-25 DIAGNOSIS — Z13.820 OSTEOPOROSIS SCREENING: ICD-10-CM

## 2025-03-25 DIAGNOSIS — Z12.31 ENCOUNTER FOR SCREENING MAMMOGRAM FOR MALIGNANT NEOPLASM OF BREAST: ICD-10-CM

## 2025-03-25 PROCEDURE — 77067 SCR MAMMO BI INCL CAD: CPT

## 2025-03-25 PROCEDURE — 77080 DXA BONE DENSITY AXIAL: CPT

## 2025-03-25 PROCEDURE — 77063 BREAST TOMOSYNTHESIS BI: CPT

## 2025-03-26 ENCOUNTER — TELEPHONE (OUTPATIENT)
Dept: GASTROENTEROLOGY | Facility: CLINIC | Age: 65
End: 2025-03-26

## 2025-03-26 NOTE — TELEPHONE ENCOUNTER
Procedure confirmed  Colonoscopy     Via: Voice mail    Instructions given: [facility]     Prep Given: Miralax/Dulcolax    Call the office if there are any questions.

## 2025-04-03 ENCOUNTER — TELEPHONE (OUTPATIENT)
Age: 65
End: 2025-04-03

## 2025-04-03 NOTE — LETTER
April 3/2025      Dear Saritha,    Attached please find prep instructions.      Please contact us at [phone] with any questions       Sincerely,      [facility]

## 2025-04-03 NOTE — TELEPHONE ENCOUNTER
Pt requesting for jannette/ducl prep instructions be sent via [employer]. I sent via [employer] letters and pt was able to open them while we were on the phone.

## 2025-04-06 ENCOUNTER — NURSE TRIAGE (OUTPATIENT)
Dept: OTHER | Facility: OTHER | Age: 65
End: 2025-04-06

## 2025-04-06 NOTE — TELEPHONE ENCOUNTER
"FOLLOW UP: None needed at this time    REASON FOR CONVERSATION: Advice Only    SYMPTOMS: questions regarding colonoscopy on Tuesday 4/11    OTHER: Patient had no further concerns.    DISPOSITION: Home Care (Information or Advice Only Call)      Reason for Disposition   Question about upcoming scheduled surgery, procedure or test, no triage required, and triager able to answer question    Answer Assessment - Initial Assessment Questions  1. REASON FOR CALL: \"What is the main reason for your call?\" or \"How can I best help you?\"        Patient calling to ask if eating small amount of popcorn will affect colonoscopy on Tuesday. Patient also confirming prep instructions. Reassurance provided and recommended patient drink more fluids today to properly flush popcorn material. Patient verbalized understanding and stated she had no further questions regarding the prep.    Protocols used: Information Only Call - No Triage-Adult-    " [address]  [facility] EMERGENCY DEPT  [address] [place] [address]  [phone]    Work/School Note    Date: 7/28/2022     To Whom It May concern:    Betty Blue was evaluated by the following provider(s):  Attending Provider: Gina Richards, [address] [place] [address] virus is suspected. Per the CDC guidelines we recommend home isolation until the following conditions are all met:    1. At least five days have passed since symptoms first appeared and/or had a close exposure,   2. After home isolation for five days, wearing a mask around others for the next five days,  3. At least 24 have passed since last fever without the use of fever-reducing medications and  4.  Symptoms (eg cough, shortness of breath) have improved      Sincerely,          Sidra Brooks MD

## 2025-04-08 ENCOUNTER — ANESTHESIA EVENT (OUTPATIENT)
Dept: GASTROENTEROLOGY | Facility: HOSPITAL | Age: 65
End: 2025-04-08
Payer: COMMERCIAL

## 2025-04-08 ENCOUNTER — ANESTHESIA (OUTPATIENT)
Dept: GASTROENTEROLOGY | Facility: HOSPITAL | Age: 65
End: 2025-04-08
Payer: COMMERCIAL

## 2025-04-08 ENCOUNTER — HOSPITAL ENCOUNTER (OUTPATIENT)
Dept: GASTROENTEROLOGY | Facility: HOSPITAL | Age: 65
Setting detail: OUTPATIENT SURGERY
Discharge: HOME | End: 2025-04-08
Attending: INTERNAL MEDICINE
Payer: COMMERCIAL

## 2025-04-08 VITALS
RESPIRATION RATE: 20 BRPM | DIASTOLIC BLOOD PRESSURE: 55 MMHG | SYSTOLIC BLOOD PRESSURE: 99 MMHG | OXYGEN SATURATION: 95 % | HEART RATE: 58 BPM | TEMPERATURE: 97.6 F

## 2025-04-08 DIAGNOSIS — Z12.11 SPECIAL SCREENING FOR MALIGNANT NEOPLASMS, COLON: ICD-10-CM

## 2025-04-08 PROCEDURE — 88305 TISSUE EXAM BY PATHOLOGIST: CPT | Performed by: PATHOLOGY

## 2025-04-08 RX ORDER — LIDOCAINE HYDROCHLORIDE 20 MG/ML
INJECTION, SOLUTION EPIDURAL; INFILTRATION; INTRACAUDAL; PERINEURAL AS NEEDED
Status: DISCONTINUED | OUTPATIENT
Start: 2025-04-08 | End: 2025-04-08

## 2025-04-08 RX ORDER — LISINOPRIL 10 MG/1
10 TABLET ORAL DAILY
COMMUNITY

## 2025-04-08 RX ORDER — PROPOFOL 10 MG/ML
INJECTION, EMULSION INTRAVENOUS AS NEEDED
Status: DISCONTINUED | OUTPATIENT
Start: 2025-04-08 | End: 2025-04-08

## 2025-04-08 RX ORDER — SODIUM CHLORIDE 9 MG/ML
INJECTION, SOLUTION INTRAVENOUS CONTINUOUS PRN
Status: DISCONTINUED | OUTPATIENT
Start: 2025-04-08 | End: 2025-04-08

## 2025-04-08 RX ORDER — SODIUM CHLORIDE 9 MG/ML
100 INJECTION, SOLUTION INTRAVENOUS CONTINUOUS
Status: DISCONTINUED | OUTPATIENT
Start: 2025-04-08 | End: 2025-04-12 | Stop reason: HOSPADM

## 2025-04-08 RX ORDER — FAMOTIDINE 40 MG/1
40 TABLET, FILM COATED ORAL DAILY
COMMUNITY

## 2025-04-08 RX ORDER — SODIUM CHLORIDE 9 MG/ML
100 INJECTION, SOLUTION INTRAVENOUS CONTINUOUS
Status: CANCELLED | OUTPATIENT
Start: 2025-04-08

## 2025-04-08 RX ADMIN — PROPOFOL 25 MG: 10 INJECTION, EMULSION INTRAVENOUS at 09:23

## 2025-04-08 RX ADMIN — PROPOFOL 25 MG: 10 INJECTION, EMULSION INTRAVENOUS at 09:30

## 2025-04-08 RX ADMIN — SODIUM CHLORIDE: 0.9 INJECTION, SOLUTION INTRAVENOUS at 09:13

## 2025-04-08 RX ADMIN — PROPOFOL 100 MG: 10 INJECTION, EMULSION INTRAVENOUS at 09:16

## 2025-04-08 RX ADMIN — PROPOFOL 25 MG: 10 INJECTION, EMULSION INTRAVENOUS at 09:18

## 2025-04-08 RX ADMIN — PROPOFOL 25 MG: 10 INJECTION, EMULSION INTRAVENOUS at 09:20

## 2025-04-08 RX ADMIN — LIDOCAINE HYDROCHLORIDE 100 MG: 20 INJECTION, SOLUTION EPIDURAL; INFILTRATION; INTRACAUDAL; PERINEURAL at 09:16

## 2025-04-08 RX ADMIN — PROPOFOL 25 MG: 10 INJECTION, EMULSION INTRAVENOUS at 09:17

## 2025-04-08 RX ADMIN — PROPOFOL 25 MG: 10 INJECTION, EMULSION INTRAVENOUS at 09:24

## 2025-04-08 RX ADMIN — PROPOFOL 25 MG: 10 INJECTION, EMULSION INTRAVENOUS at 09:19

## 2025-04-08 RX ADMIN — PROPOFOL 25 MG: 10 INJECTION, EMULSION INTRAVENOUS at 09:28

## 2025-04-08 RX ADMIN — PROPOFOL 25 MG: 10 INJECTION, EMULSION INTRAVENOUS at 09:21

## 2025-04-08 RX ADMIN — PROPOFOL 25 MG: 10 INJECTION, EMULSION INTRAVENOUS at 09:22

## 2025-04-08 RX ADMIN — PROPOFOL 25 MG: 10 INJECTION, EMULSION INTRAVENOUS at 09:26

## 2025-04-08 NOTE — ANESTHESIA POSTPROCEDURE EVALUATION
Post-Op Assessment Note    CV Status:  Stable    Pain management: adequate       Mental Status:  Lethargic and sleepy   Hydration Status:  Stable   PONV Controlled:  None   Airway Patency:  Patent     Post Op Vitals Reviewed: Yes    No anethesia notable event occurred.    Staff: CRNA         Last Filed PACU Vitals:  Vitals Value Taken Time   Temp     Pulse     BP     Resp     SpO2

## 2025-04-08 NOTE — ANESTHESIA PREPROCEDURE EVALUATION
Procedure:  COLONOSCOPY    Relevant Problems   ANESTHESIA (within normal limits)      CARDIO   (+) HTN (hypertension)      GI/HEPATIC   (+) GERD (gastroesophageal reflux disease)      PULMONARY (within normal limits)  Nonsmoker    (-) Sleep apnea   (-) URI (upper respiratory infection)    BMI 33    Physical Exam    Airway    Mallampati score: II  TM Distance: <3 FB  Neck ROM: full     Dental   No notable dental hx     Cardiovascular      Pulmonary      Other Findings  post-pubertal.      Anesthesia Plan  ASA Score- 2     Anesthesia Type- IV sedation with anesthesia with ASA Monitors.         Additional Monitors:     Airway Plan:            Plan Factors-Exercise tolerance (METS): >4 METS.    Chart reviewed.   Existing labs reviewed. Patient summary reviewed.    Patient is not a current smoker.              Induction- intravenous.    Postoperative Plan-         Informed Consent- Anesthetic plan and risks discussed with patient.  I personally reviewed this patient with the CRNA. Discussed and agreed on the Anesthesia Plan with the CRNA..      NPO Status:  Vitals Value Taken Time   Date of last liquid 04/08/25 04/08/25 0838   Time of last liquid 0430 04/08/25 0838   Date of last solid 04/06/25 04/08/25 0838   Time of last solid 1800 04/08/25 0838

## 2025-04-08 NOTE — H&P
History and Physical - [facility]  Saritha Boss 64 y.o. female MRN: [id number]    HPI: Saritha Boss is a 64 y.o. year old female who presents for screening colonoscopy    REVIEW OF SYSTEMS: Per the HPI, and otherwise unremarkable.    Historical Information   Past Medical History:   Diagnosis Date    Arthritis     Colon polyp ??    GERD (gastroesophageal reflux disease)     Hypertension     Lyme disease      Past Surgical History:   Procedure Laterality Date     SECTION      COLONOSCOPY  within 10 years        JOINT REPLACEMENT      UPPER GASTROINTESTINAL ENDOSCOPY  a few times years ago    2010    VULVA SURGERY       Social History   Social History     Substance and Sexual Activity   Alcohol Use Yes    Alcohol/week: 3.0 standard drinks of alcohol    Types: 3 Glasses of wine per week    Comment: social     Social History     Substance and Sexual Activity   Drug Use Not Currently    Types: Marijuana     Social History     Tobacco Use   Smoking Status Former    Current packs/day: 0.25    Average packs/day: 0.3 packs/day for 5.0 years (1.3 ttl pk-yrs)    Types: Cigarettes   Smokeless Tobacco Never   Tobacco Comments    occasional around 22 years old     Family History   Problem Relation Age of Onset    Lung cancer Mother 70        non smoker    Heart disease Father         heart attack at 51    No Known Problems Sister     No Known Problems Sister     No Known Problems Daughter     No Known Problems Daughter     No Known Problems Maternal Grandmother     No Known Problems Maternal Grandfather     No Known Problems Paternal Grandmother     Multiple myeloma Paternal Grandfather     Colon cancer Neg Hx     Colon polyps Neg Hx        Meds/Allergies       Current Outpatient Medications:     Cholecalciferol (VITAMIN D3) 1,000 units tablet    famotidine (PEPCID) 40 MG tablet    lisinopril (ZESTRIL) 10 mg tablet    clindamycin (CLEOCIN) 300 MG capsule    esomeprazole (NexIUM) 40 MG capsule     lamoTRIgine (LaMICtal) 100 mg tablet    Current Facility-Administered Medications:     sodium chloride 0.9 % infusion, 100 mL/hr, Intravenous, Continuous    Allergies   Allergen Reactions    Penicillins Anaphylaxis    Naproxen Facial Swelling    Nsaids Facial Swelling    Cefuroxime Rash       Objective     /69   Pulse 66   Temp 97.6 °F (36.4 °C) (Temporal)   Resp 18   SpO2 97%     PHYSICAL EXAM    Gen: NAD AAOx3  Head: Normocephalic, Atraumatic  CV: S1S2 RRR no m/r/g  CHEST: Clear b/l no c/r/w  ABD: soft, +BS NT/ND  EXT: no edema    ASSESSMENT/PLAN:  This is a 64 y.o. year old female here for average risk screening colonoscopy, and she is stable and optimized for her procedure.

## 2025-04-15 PROCEDURE — 88305 TISSUE EXAM BY PATHOLOGIST: CPT | Performed by: PATHOLOGY

## 2025-04-16 ENCOUNTER — RESULTS FOLLOW-UP (OUTPATIENT)
Dept: GASTROENTEROLOGY | Facility: CLINIC | Age: 65
End: 2025-04-16